# Patient Record
Sex: MALE | Race: WHITE | NOT HISPANIC OR LATINO | URBAN - METROPOLITAN AREA
[De-identification: names, ages, dates, MRNs, and addresses within clinical notes are randomized per-mention and may not be internally consistent; named-entity substitution may affect disease eponyms.]

---

## 2019-01-23 ENCOUNTER — EMERGENCY (EMERGENCY)
Facility: HOSPITAL | Age: 84
LOS: 1 days | Discharge: HOME | End: 2019-01-23
Attending: EMERGENCY MEDICINE | Admitting: EMERGENCY MEDICINE
Payer: MEDICARE

## 2019-01-23 VITALS
DIASTOLIC BLOOD PRESSURE: 96 MMHG | SYSTOLIC BLOOD PRESSURE: 152 MMHG | RESPIRATION RATE: 18 BRPM | OXYGEN SATURATION: 96 % | TEMPERATURE: 98 F | HEART RATE: 75 BPM

## 2019-01-23 PROBLEM — Z00.00 ENCOUNTER FOR PREVENTIVE HEALTH EXAMINATION: Status: ACTIVE | Noted: 2019-01-23

## 2019-01-23 PROCEDURE — 99285 EMERGENCY DEPT VISIT HI MDM: CPT | Mod: 25,GC

## 2019-01-23 PROCEDURE — 93010 ELECTROCARDIOGRAM REPORT: CPT

## 2019-01-23 RX ORDER — ACETAMINOPHEN 500 MG
975 TABLET ORAL ONCE
Qty: 0 | Refills: 0 | Status: COMPLETED | OUTPATIENT
Start: 2019-01-23 | End: 2019-01-23

## 2019-01-23 RX ORDER — MORPHINE SULFATE 50 MG/1
4 CAPSULE, EXTENDED RELEASE ORAL ONCE
Qty: 0 | Refills: 0 | Status: DISCONTINUED | OUTPATIENT
Start: 2019-01-23 | End: 2019-01-23

## 2019-01-23 RX ORDER — ASPIRIN/CALCIUM CARB/MAGNESIUM 324 MG
162 TABLET ORAL DAILY
Qty: 0 | Refills: 0 | Status: DISCONTINUED | OUTPATIENT
Start: 2019-01-23 | End: 2019-01-27

## 2019-01-23 NOTE — ED ADULT TRIAGE NOTE - CHIEF COMPLAINT QUOTE
Pt c/o sharp left sided c/p rad to left shoulder worse when breathing. Reports had abd pain rad to chest 2 days ago. Also c/o dizziness/lightheadedness. Denies n/v. EKG completed. PMHx htn. Pt appears uncomfortable.

## 2019-01-23 NOTE — ED PROVIDER NOTE - NSFOLLOWUPINSTRUCTIONS_ED_ALL_ED_FT
Follow up with the cardiology referral as needed for repeat evaluation if you have ongoing symptoms. Take acetaminophen (Tylenol) 975mg (3 regular strength tablets or 2 extra strength tablets) as often as every 6 hours as needed for pain. Never take more than 4000mg of acetaminophen/Tylenol in any 24 hour period. Be cautious of over the counter medications as many formularies contain acetaminophen in them. Take ibuprofen (or Motrin) 600mg (3 tablets) up to 4 times per day as needed for pain with food or milk. Return here to the emergency department for any new symptoms of concern.

## 2019-01-23 NOTE — ED PROVIDER NOTE - MEDICAL DECISION MAKING DETAILS
83M hx htn and gastric ulcer presenting with 2-3 days of chest pain, abdominal pain, fevers, chills, will eval with basics, trop, ekg, cxr, follow up studies, reassess, dispo.

## 2019-01-23 NOTE — ED PROVIDER NOTE - OBJECTIVE STATEMENT
2-3 days of subjective fevers and chills. Initially came on as abdominal pain, radiated up to chest, made him feel uncomfortable while taking a deep breath. Now notes that the pain is localized to the chest, did radiate to the shoulders but does not now, is still having intermittent chills but no fevers. Has not been walking secondary to chronic back pain. Hx significant for HTN and gastric ulcer, does not smoke cigarettes. Is followed by Dr. Satnam Holden. 2-3 days of subjective fevers and chills. Initially came on as abdominal pain, radiated up to chest, made him feel uncomfortable while taking a deep breath. Now notes that the pain is localized to the chest, did radiate to the shoulders but does not now, is still having intermittent chills but no fevers. Has not been walking secondary to chronic back pain. Hx significant for HTN and gastric ulcer, does not smoke cigarettes. Is followed by Dr. Satnam Holden.    Attendinyo male presents with chest pain that is pleuritic in nature.  no cough.  had uri symptoms but this seems different.  no chest congestion.

## 2019-01-23 NOTE — ED ADULT NURSE NOTE - OBJECTIVE STATEMENT
Pt. received into room # 4 with c/o chest pain started today; Pt. had a cold and fever since Saturday as per family. CM to NSR. vitals stable. 20g placed to L ac. labs sent. MD stern in progress. will continue to monitor. Endorsed to Primary RN

## 2019-01-23 NOTE — ED PROVIDER NOTE - NSFOLLOWUPCLINICS_GEN_ALL_ED_FT
[facility]  Cardiology  [address] [place] [address]  Phone: [phone]  Fax:   Follow Up Time: 4-6 Days

## 2019-01-24 VITALS
TEMPERATURE: 98 F | RESPIRATION RATE: 18 BRPM | OXYGEN SATURATION: 100 % | DIASTOLIC BLOOD PRESSURE: 72 MMHG | SYSTOLIC BLOOD PRESSURE: 119 MMHG

## 2019-01-24 LAB
ALBUMIN SERPL ELPH-MCNC: 4.2 G/DL — SIGNIFICANT CHANGE UP (ref 3.3–5)
ALP SERPL-CCNC: 95 U/L — SIGNIFICANT CHANGE UP (ref 40–120)
ALT FLD-CCNC: 33 U/L — SIGNIFICANT CHANGE UP (ref 4–41)
ANION GAP SERPL CALC-SCNC: 12 MMO/L — SIGNIFICANT CHANGE UP (ref 7–14)
AST SERPL-CCNC: 27 U/L — SIGNIFICANT CHANGE UP (ref 4–40)
BASOPHILS # BLD AUTO: 0.03 K/UL — SIGNIFICANT CHANGE UP (ref 0–0.2)
BASOPHILS NFR BLD AUTO: 0.4 % — SIGNIFICANT CHANGE UP (ref 0–2)
BILIRUB SERPL-MCNC: 0.9 MG/DL — SIGNIFICANT CHANGE UP (ref 0.2–1.2)
BUN SERPL-MCNC: 26 MG/DL — HIGH (ref 7–23)
CALCIUM SERPL-MCNC: 9.8 MG/DL — SIGNIFICANT CHANGE UP (ref 8.4–10.5)
CHLORIDE SERPL-SCNC: 99 MMOL/L — SIGNIFICANT CHANGE UP (ref 98–107)
CO2 SERPL-SCNC: 28 MMOL/L — SIGNIFICANT CHANGE UP (ref 22–31)
CREAT SERPL-MCNC: 0.99 MG/DL — SIGNIFICANT CHANGE UP (ref 0.5–1.3)
D DIMER BLD IA.RAPID-MCNC: 327 NG/ML — SIGNIFICANT CHANGE UP
EOSINOPHIL # BLD AUTO: 0.05 K/UL — SIGNIFICANT CHANGE UP (ref 0–0.5)
EOSINOPHIL NFR BLD AUTO: 0.7 % — SIGNIFICANT CHANGE UP (ref 0–6)
GLUCOSE SERPL-MCNC: 134 MG/DL — HIGH (ref 70–99)
HCT VFR BLD CALC: 42.5 % — SIGNIFICANT CHANGE UP (ref 39–50)
HGB BLD-MCNC: 13.6 G/DL — SIGNIFICANT CHANGE UP (ref 13–17)
IMM GRANULOCYTES NFR BLD AUTO: 0.3 % — SIGNIFICANT CHANGE UP (ref 0–1.5)
LYMPHOCYTES # BLD AUTO: 1.38 K/UL — SIGNIFICANT CHANGE UP (ref 1–3.3)
LYMPHOCYTES # BLD AUTO: 18.4 % — SIGNIFICANT CHANGE UP (ref 13–44)
MCHC RBC-ENTMCNC: 28.8 PG — SIGNIFICANT CHANGE UP (ref 27–34)
MCHC RBC-ENTMCNC: 32 % — SIGNIFICANT CHANGE UP (ref 32–36)
MCV RBC AUTO: 89.9 FL — SIGNIFICANT CHANGE UP (ref 80–100)
MONOCYTES # BLD AUTO: 0.65 K/UL — SIGNIFICANT CHANGE UP (ref 0–0.9)
MONOCYTES NFR BLD AUTO: 8.7 % — SIGNIFICANT CHANGE UP (ref 2–14)
NEUTROPHILS # BLD AUTO: 5.37 K/UL — SIGNIFICANT CHANGE UP (ref 1.8–7.4)
NEUTROPHILS NFR BLD AUTO: 71.5 % — SIGNIFICANT CHANGE UP (ref 43–77)
NRBC # FLD: 0 K/UL — LOW (ref 25–125)
PLATELET # BLD AUTO: 218 K/UL — SIGNIFICANT CHANGE UP (ref 150–400)
PMV BLD: 11.5 FL — SIGNIFICANT CHANGE UP (ref 7–13)
POTASSIUM SERPL-MCNC: 4.6 MMOL/L — SIGNIFICANT CHANGE UP (ref 3.5–5.3)
POTASSIUM SERPL-SCNC: 4.6 MMOL/L — SIGNIFICANT CHANGE UP (ref 3.5–5.3)
PROT SERPL-MCNC: 8.3 G/DL — SIGNIFICANT CHANGE UP (ref 6–8.3)
RBC # BLD: 4.73 M/UL — SIGNIFICANT CHANGE UP (ref 4.2–5.8)
RBC # FLD: 11.6 % — SIGNIFICANT CHANGE UP (ref 10.3–14.5)
SODIUM SERPL-SCNC: 139 MMOL/L — SIGNIFICANT CHANGE UP (ref 135–145)
TROPONIN T, HIGH SENSITIVITY: 16 NG/L — SIGNIFICANT CHANGE UP (ref ?–14)
TROPONIN T, HIGH SENSITIVITY: 18 NG/L — SIGNIFICANT CHANGE UP (ref ?–14)
WBC # BLD: 7.5 K/UL — SIGNIFICANT CHANGE UP (ref 3.8–10.5)
WBC # FLD AUTO: 7.5 K/UL — SIGNIFICANT CHANGE UP (ref 3.8–10.5)

## 2019-01-24 PROCEDURE — 71046 X-RAY EXAM CHEST 2 VIEWS: CPT | Mod: 26

## 2019-01-24 PROCEDURE — 71275 CT ANGIOGRAPHY CHEST: CPT | Mod: 26

## 2019-01-24 RX ORDER — ACETAMINOPHEN 500 MG
2 TABLET ORAL
Qty: 112 | Refills: 0 | OUTPATIENT
Start: 2019-01-24 | End: 2019-02-06

## 2019-01-24 RX ORDER — MORPHINE SULFATE 50 MG/1
4 CAPSULE, EXTENDED RELEASE ORAL ONCE
Qty: 0 | Refills: 0 | Status: DISCONTINUED | OUTPATIENT
Start: 2019-01-24 | End: 2019-01-24

## 2019-01-24 RX ADMIN — Medication 975 MILLIGRAM(S): at 00:51

## 2019-01-24 RX ADMIN — MORPHINE SULFATE 4 MILLIGRAM(S): 50 CAPSULE, EXTENDED RELEASE ORAL at 00:44

## 2019-01-24 RX ADMIN — MORPHINE SULFATE 4 MILLIGRAM(S): 50 CAPSULE, EXTENDED RELEASE ORAL at 06:48

## 2019-01-24 NOTE — ED ADULT NURSE REASSESSMENT NOTE - NS ED NURSE REASSESS COMMENT FT1
Pt. noted in SVT -150.  Pt. moved into room #3, asymptomatic at this time.  MD stern in progress. will continue to monitor.

## 2021-08-31 ENCOUNTER — APPOINTMENT (OUTPATIENT)
Dept: RADIATION ONCOLOGY | Facility: CLINIC | Age: 86
End: 2021-08-31
Payer: MEDICARE

## 2021-08-31 VITALS — HEIGHT: 70 IN | WEIGHT: 175 LBS | BODY MASS INDEX: 25.05 KG/M2 | RESPIRATION RATE: 18 BRPM

## 2021-08-31 DIAGNOSIS — I10 ESSENTIAL (PRIMARY) HYPERTENSION: ICD-10-CM

## 2021-08-31 DIAGNOSIS — Z78.9 OTHER SPECIFIED HEALTH STATUS: ICD-10-CM

## 2021-08-31 PROCEDURE — 99204 OFFICE O/P NEW MOD 45 MIN: CPT | Mod: 25

## 2021-08-31 RX ORDER — ALITRETINOIN 60 MG/60G
GEL TOPICAL
Refills: 0 | Status: ACTIVE | COMMUNITY

## 2021-08-31 RX ORDER — 70%ISOPROPYL ALCOHOL 0.7 ML/ML
70 SWAB TOPICAL
Refills: 0 | Status: ACTIVE | COMMUNITY

## 2021-08-31 NOTE — VITALS
[Maximal Pain Intensity: 3/10] : 3/10 [Least Pain Intensity: 0/10] : 0/10 [Pain Description/Quality: ___] : Pain description/quality: [unfilled] [Pain Duration: ___] : Pain duration: [unfilled] [Pain Location: ___] : Pain Location: [unfilled] [NoTreatment Scheduled] : no treatment scheduled [80: Normal activity with effort; some signs or symptoms of disease.] : 80: Normal activity with effort; some signs or symptoms of disease.  [ECOG Performance Status: 2 - Ambulatory and capable of all self care but unable to carry out any work activities] : Performance Status: 2 - Ambulatory and capable of all self care but unable to carry out any work activities. Up and about more than 50% of waking hours

## 2021-09-07 NOTE — PHYSICAL EXAM
[Normal] : no focal deficits [de-identified] : As above [de-identified] : Bilateral KS lower extremity

## 2021-09-07 NOTE — HISTORY OF PRESENT ILLNESS
[FreeTextEntry1] : 85 y/o male with h/o HTN, cholecystectomy, hernia repair, newly diagnosed bilateral foot kaposi's sarcoma presents to discuss radiation treatment options for feet kaposi sarcoma. \par \par Initially he noticed bilateral foot rashes or dots in 2019, which is getting worse and more dots / rashes over feet recently. Using Panretin gel 0.1 %, but did not help that much. 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 Dermatologist Dr Cruz, phone [phone]. \par \par

## 2021-09-07 NOTE — REVIEW OF SYSTEMS
[Negative] : Allergic/Immunologic [FreeTextEntry5] : HTN [de-identified] : bilateral foot Kaposi's sarcoma [FreeTextEntry7] : cholecystectomy

## 2021-09-09 ENCOUNTER — NON-APPOINTMENT (OUTPATIENT)
Age: 86
End: 2021-09-09

## 2021-09-17 ENCOUNTER — NON-APPOINTMENT (OUTPATIENT)
Age: 86
End: 2021-09-17

## 2021-09-23 ENCOUNTER — NON-APPOINTMENT (OUTPATIENT)
Age: 86
End: 2021-09-23

## 2021-09-23 DIAGNOSIS — C46.9 KAPOSI'S SARCOMA, UNSPECIFIED: ICD-10-CM

## 2021-09-26 NOTE — DISEASE MANAGEMENT
[TTNM] : 0 [NTNM] : 0 [MTNM] : 0 [de-identified] : right foot lateral, right foot sole, left posterior foot, left toe, left lateral foot

## 2021-09-26 NOTE — DISEASE MANAGEMENT
[Clinical] : TNM Stage: c [0] : 0 [TTNM] : 0 [MTNM] : 0 [NTNM] : 0 [de-identified] : right foot lateral, right foot sole, left posterior foot, left toe, left lateral foot

## 2021-09-26 NOTE — REVIEW OF SYSTEMS
[FreeTextEntry5] : HTN [FreeTextEntry7] : cholecystectomy [de-identified] : bilateral foot Kaposi's sarcoma

## 2021-09-26 NOTE — HISTORY OF PRESENT ILLNESS
[FreeTextEntry1] : 85 y/o male with h/o HTN, cholecystectomy, hernia repair, newly diagnosed bilateral foot kaposi's sarcoma presents to discuss radiation treatment options for feet kaposi sarcoma. \par \par Initially he noticed bilateral foot rashes or dots in 2019, which is getting worse and more dots / rashes over feet recently. Using Panretin gel 0.1 %, but did not help that much. 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 Dermatologist Dr Cruz, phone [phone]. \par \par On radiation treatment to bilateral foot for Kaposi's sarcoma. No changes from preRT baseline. \par \par \par \par

## 2021-09-26 NOTE — REVIEW OF SYSTEMS
[Alopecia: Grade 0] : Alopecia: Grade 0 [Skin Atrophy: Grade 0] : Skin Atrophy: Grade 0 [Skin Hyperpigmentation: Grade 1 - Hyperpigmentation covering <10% BSA; no psychosocial impact] : Skin Hyperpigmentation: Grade 1 - Hyperpigmentation covering <10% BSA; no psychosocial impact [Skin Induration: Grade 0] : Skin Induration: Grade 0 [Dermatitis Radiation: Grade 1 - Faint erythema or dry desquamation] : Dermatitis Radiation: Grade 1 - Faint erythema or dry desquamation [Negative] : Allergic/Immunologic [FreeTextEntry5] : HTN [FreeTextEntry7] : cholecystectomy [de-identified] : bilateral foot Kaposi's sarcoma

## 2021-10-17 NOTE — DISEASE MANAGEMENT
[Clinical] : TNM Stage: c [0] : 0 [TTNM] : 0 [NTNM] : 0 [MTNM] : 0 [de-identified] : right foot lateral, right foot sole, left posterior foot, left toe, left lateral foot

## 2021-10-17 NOTE — HISTORY OF PRESENT ILLNESS
[FreeTextEntry1] : 87 y/o male with h/o HTN, cholecystectomy, hernia repair, newly diagnosed bilateral foot kaposi's sarcoma presents to discuss radiation treatment options for feet kaposi sarcoma. \par \par Initially he noticed bilateral foot rashes or dots in 2019, which is getting worse and more dots / rashes over feet recently. Using Panretin gel 0.1 %, but did not help that much. 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 Dermatologist Dr Cruz, phone [phone]. \par \par Last week of radiation treatment. On radiation treatment to bilateral foot for Kaposi's sarcoma (right foot lateral, right foot sole, left posterior foot, left toe, left lateral foot). Using skin cream for skin toxicity from radiation. No bleeding, muscle weakness. \par \par \par \par

## 2021-10-17 NOTE — REVIEW OF SYSTEMS
[Alopecia: Grade 0] : Alopecia: Grade 0 [Skin Atrophy: Grade 0] : Skin Atrophy: Grade 0 [Skin Hyperpigmentation: Grade 1 - Hyperpigmentation covering <10% BSA; no psychosocial impact] : Skin Hyperpigmentation: Grade 1 - Hyperpigmentation covering <10% BSA; no psychosocial impact [Skin Induration: Grade 0] : Skin Induration: Grade 0 [Dermatitis Radiation: Grade 1 - Faint erythema or dry desquamation] : Dermatitis Radiation: Grade 1 - Faint erythema or dry desquamation [Negative] : Allergic/Immunologic [FreeTextEntry5] : HTN [FreeTextEntry7] : cholecystectomy [de-identified] : bilateral foot Kaposi's sarcoma

## 2021-11-30 ENCOUNTER — APPOINTMENT (OUTPATIENT)
Dept: RADIATION ONCOLOGY | Facility: CLINIC | Age: 86
End: 2021-11-30
Payer: MEDICARE

## 2021-11-30 VITALS — WEIGHT: 175 LBS | BODY MASS INDEX: 25.05 KG/M2 | RESPIRATION RATE: 18 BRPM | HEIGHT: 70 IN

## 2021-11-30 PROCEDURE — 99024 POSTOP FOLLOW-UP VISIT: CPT

## 2021-12-06 NOTE — HISTORY OF PRESENT ILLNESS
[FreeTextEntry1] : 85 y/o male with h/o HTN, cholecystectomy, hernia repair, newly diagnosed bilateral foot kaposi's sarcoma presents to discuss radiation treatment options for feet kaposi sarcoma. \par \par Initially he noticed bilateral foot rashes or dots in 2019, which is getting worse and more dots / rashes over feet recently. Using Panretin gel 0.1 %, but did not help that much. 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 Dermatologist Dr Cruz, phone [phone]. \par \par Last OTV note during OTV in 9/2021. On radiation treatment to bilateral foot for Kaposi's sarcoma (right foot lateral, right foot sole, left posterior foot, left toe, left lateral foot). Using skin cream for skin toxicity from radiation. No bleeding, muscle weakness. \par \par Today for f/u. c/o pain over right lateral foot. No bleeding or itchiness over radiation area.  Noted some rash over left calf, and right heel. Some pictures taken for documentation\par \par \par \par

## 2021-12-06 NOTE — DISEASE MANAGEMENT
[Clinical] : TNM Stage: c [0] : 0 [TTNM] : 0 [NTNM] : 0 [MTNM] : 0 [de-identified] : right foot lateral, right foot sole, left posterior foot, left toe, left lateral foot

## 2021-12-06 NOTE — REVIEW OF SYSTEMS
[Negative] : Allergic/Immunologic [Alopecia: Grade 0] : Alopecia: Grade 0 [Skin Atrophy: Grade 0] : Skin Atrophy: Grade 0 [Skin Hyperpigmentation: Grade 0] : Skin Hyperpigmentation: Grade 0 [Skin Induration: Grade 0] : Skin Induration: Grade 0 [Dermatitis Radiation: Grade 0] : Dermatitis Radiation: Grade 0 [FreeTextEntry5] : HTN [FreeTextEntry7] : cholecystectomy [de-identified] : bilateral foot Kaposi's sarcoma

## 2021-12-06 NOTE — PHYSICAL EXAM
[Normal] : oriented to person, place and time, the affect was normal, the mood was normal and not anxious [de-identified] : Lesions improving

## 2022-01-20 NOTE — VITALS
[Maximal Pain Intensity: 1/10] : 1/10 [Least Pain Intensity: 0/10] : 0/10 [Pain Description/Quality: ___] : Pain description/quality: [unfilled] [Pain Duration: ___] : Pain duration: [unfilled] [Pain Location: ___] : Pain Location: [unfilled] [NoTreatment Scheduled] : no treatment scheduled [80: Normal activity with effort; some signs or symptoms of disease.] : 80: Normal activity with effort; some signs or symptoms of disease.  [ECOG Performance Status: 2 - Ambulatory and capable of all self care but unable to carry out any work activities] : Performance Status: 2 - Ambulatory and capable of all self care but unable to carry out any work activities. Up and about more than 50% of waking hours

## 2022-01-21 ENCOUNTER — APPOINTMENT (OUTPATIENT)
Dept: RADIATION ONCOLOGY | Facility: CLINIC | Age: 87
End: 2022-01-21
Payer: MEDICARE

## 2022-01-21 PROCEDURE — 99214 OFFICE O/P EST MOD 30 MIN: CPT | Mod: 25

## 2022-01-24 NOTE — PHYSICAL EXAM
[Normal] : oriented to person, place and time, the affect was normal, the mood was normal and not anxious [de-identified] : Right ankle recurrence

## 2022-01-24 NOTE — HISTORY OF PRESENT ILLNESS
[FreeTextEntry1] : 85 y/o male with h/o HTN, cholecystectomy, hernia repair, newly diagnosed bilateral foot kaposi's sarcoma presents to discuss radiation treatment options for feet kaposi sarcoma. \par \par Initially he noticed bilateral foot rashes or dots in 2019, which is getting worse and more dots / rashes over feet recently. Using Panretin gel 0.1 %, but did not help that much. 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 Dermatologist Dr Cruz, phone [phone]. \par \par Last OTV note during OTV in 9/2021. On radiation treatment to bilateral foot for Kaposi's sarcoma (right foot lateral, right foot sole, left posterior foot, left toe, left lateral foot). Using skin cream for skin toxicity from radiation. No bleeding, muscle weakness. \par \par Today for f/u. No bleeding or itchiness over radiation area.  Noted some rash over left calf, and right heel. \par \par \par \par

## 2022-01-24 NOTE — DISEASE MANAGEMENT
[Clinical] : TNM Stage: c [0] : 0 [TTNM] : 0 [NTNM] : 0 [MTNM] : 0 [de-identified] : right foot lateral, right foot sole, left posterior foot, left toe, left lateral foot

## 2022-02-03 ENCOUNTER — FORM ENCOUNTER (OUTPATIENT)
Age: 87
End: 2022-02-03

## 2022-02-04 ENCOUNTER — NON-APPOINTMENT (OUTPATIENT)
Age: 87
End: 2022-02-04

## 2022-02-08 NOTE — REVIEW OF SYSTEMS
[Alopecia: Grade 0] : Alopecia: Grade 0 [Skin Atrophy: Grade 0] : Skin Atrophy: Grade 0 [Skin Hyperpigmentation: Grade 1 - Hyperpigmentation covering <10% BSA; no psychosocial impact] : Skin Hyperpigmentation: Grade 1 - Hyperpigmentation covering <10% BSA; no psychosocial impact [Skin Induration: Grade 0] : Skin Induration: Grade 0 [Dermatitis Radiation: Grade 1 - Faint erythema or dry desquamation] : Dermatitis Radiation: Grade 1 - Faint erythema or dry desquamation [Negative] : Allergic/Immunologic [FreeTextEntry5] : HTN [FreeTextEntry7] : cholecystectomy [de-identified] : bilateral foot Kaposi's sarcoma

## 2022-02-08 NOTE — HISTORY OF PRESENT ILLNESS
[FreeTextEntry1] : 85 y/o male with h/o HTN, cholecystectomy, hernia repair, newly diagnosed bilateral foot kaposi's sarcoma presents to discuss radiation treatment options for feet kaposi sarcoma. \par \par Initially he noticed bilateral foot rashes or dots in 2019, which is getting worse and more dots / rashes over feet recently. Using Panretin gel 0.1 %, but did not help that much. 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 Dermatologist Dr Cruz, phone [phone]. \par \par Last OTV note during OTV in 9/2021. On radiation treatment to bilateral foot for Kaposi's sarcoma (right foot lateral, right foot sole, left posterior foot, left toe, left lateral foot). Using skin cream for skin toxicity from radiation. No bleeding, muscle weakness. \par \par 1/21/2022: Today for f/u. No bleeding or itchiness over radiation area.  Noted some rash over left calf, and right heel. \par \par 5/5 fractions of radiation to right foot. Mild skin reactions from radiation, scars noted over radiation area. No bleeding. \par \par \par \par \par \par

## 2022-02-11 ENCOUNTER — NON-APPOINTMENT (OUTPATIENT)
Age: 87
End: 2022-02-11

## 2022-02-11 RX ORDER — LIDOCAINE 5% 5 G/100G
5 CREAM TOPICAL TWICE DAILY
Qty: 1 | Refills: 1 | Status: ACTIVE | COMMUNITY
Start: 2022-02-11 | End: 1900-01-01

## 2022-02-23 NOTE — REVIEW OF SYSTEMS
Yes. Do we need to ask social work to call ehr too?   [Negative] : Allergic/Immunologic [Alopecia: Grade 0] : Alopecia: Grade 0 [Skin Atrophy: Grade 0] : Skin Atrophy: Grade 0 [Skin Hyperpigmentation: Grade 0] : Skin Hyperpigmentation: Grade 0 [Skin Induration: Grade 0] : Skin Induration: Grade 0 [Dermatitis Radiation: Grade 0] : Dermatitis Radiation: Grade 0 [FreeTextEntry5] : HTN [FreeTextEntry7] : cholecystectomy [de-identified] : bilateral foot Kaposi's sarcoma

## 2022-03-31 ENCOUNTER — APPOINTMENT (OUTPATIENT)
Dept: RADIATION ONCOLOGY | Facility: CLINIC | Age: 87
End: 2022-03-31
Payer: MEDICARE

## 2022-03-31 PROCEDURE — 99024 POSTOP FOLLOW-UP VISIT: CPT

## 2022-03-31 NOTE — REVIEW OF SYSTEMS
[Negative] : Allergic/Immunologic [Alopecia: Grade 0] : Alopecia: Grade 0 [Skin Atrophy: Grade 0] : Skin Atrophy: Grade 0 [Skin Hyperpigmentation: Grade 0] : Skin Hyperpigmentation: Grade 0 [Skin Induration: Grade 0] : Skin Induration: Grade 0 [Dermatitis Radiation: Grade 0] : Dermatitis Radiation: Grade 0 [FreeTextEntry5] : HTN [FreeTextEntry7] : cholecystectomy [de-identified] : bilateral foot Kaposi's sarcoma

## 2022-03-31 NOTE — HISTORY OF PRESENT ILLNESS
[FreeTextEntry1] : 85 y/o male with h/o HTN, cholecystectomy, hernia repair, newly diagnosed bilateral foot kaposi's sarcoma presents to discuss radiation treatment options for feet kaposi sarcoma. \par \par Initially he noticed bilateral foot rashes or dots in 2019, which is getting worse and more dots / rashes over feet recently. Using Panretin gel 0.1 %, but did not help that much. 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 Dermatologist Dr Cruz, phone [phone]. \par \par Last OTV note during OTV in 9/2021. On radiation treatment to bilateral foot for Kaposi's sarcoma (right foot lateral, right foot sole, left posterior foot, left toe, left lateral foot). Using skin cream for skin toxicity from radiation. No bleeding, muscle weakness. \par \par 1/21/2022: Today for f/u. No bleeding or itchiness over radiation area.  Noted some rash over left calf, and right heel. \par \par Last OTV note during radiation in 2/2022: 5/5 fractions of radiation to right foot. Mild skin reactions from radiation, scars noted over radiation area. No bleeding. \par \par Today for f/u. Treated lesions getting smaller. c/o lesions not treated are getting a little bigger on feet, left arm. Taking some pictures of lesions today. Pt will go [facility] for consultation. May consider radiation again if lesions getting worse\par \par \par \par \par \par

## 2022-05-24 ENCOUNTER — APPOINTMENT (OUTPATIENT)
Dept: RADIATION ONCOLOGY | Facility: CLINIC | Age: 87
End: 2022-05-24

## 2022-07-06 ENCOUNTER — NON-APPOINTMENT (OUTPATIENT)
Age: 87
End: 2022-07-06

## 2024-01-24 NOTE — HISTORY OF PRESENT ILLNESS
[FreeTextEntry1] : 87 y/o male with h/o HTN, cholecystectomy, hernia repair, newly diagnosed bilateral foot kaposi's sarcoma presents to discuss radiation treatment options for feet kaposi sarcoma. \par \par Initially he noticed bilateral foot rashes or dots in 2019, which is getting worse and more dots / rashes over feet recently. Using Panretin gel 0.1 %, but did not help that much. 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 Dermatologist Dr Cruz, phone [phone]. \par \par On radiation treatment to bilateral foot for Kaposi's sarcoma (right foot lateral, right foot sole, left posterior foot, left toe, left lateral foot). Using skin cream for skin toxicity from radiation. No bleeding, muscle weakness. \par \par \par \par  show